# Patient Record
Sex: MALE | Race: BLACK OR AFRICAN AMERICAN | NOT HISPANIC OR LATINO | Employment: OTHER | URBAN - METROPOLITAN AREA
[De-identification: names, ages, dates, MRNs, and addresses within clinical notes are randomized per-mention and may not be internally consistent; named-entity substitution may affect disease eponyms.]

---

## 2023-05-07 ENCOUNTER — TELEPHONE (OUTPATIENT)
Dept: PHARMACY | Facility: HOSPITAL | Age: 78
End: 2023-05-07
Payer: MEDICARE

## 2023-05-07 NOTE — TELEPHONE ENCOUNTER
"Population Health Outreach Call  Payor: [employer]  Purpose of call: Assess for and address any barriers to medication adherence  Medication: lisinopril 20 mg tablet  Outcome: Unable to leave voice message at patient's listed phone number, phone number states \"calling restrictions\" in place.    "

## 2023-10-02 ENCOUNTER — OFFICE VISIT (OUTPATIENT)
Dept: PRIMARY CARE | Facility: CLINIC | Age: 78
End: 2023-10-02
Payer: MEDICARE

## 2023-10-02 ENCOUNTER — LAB (OUTPATIENT)
Dept: LAB | Facility: LAB | Age: 78
End: 2023-10-02
Payer: MEDICARE

## 2023-10-02 VITALS
DIASTOLIC BLOOD PRESSURE: 88 MMHG | HEART RATE: 75 BPM | HEIGHT: 71 IN | SYSTOLIC BLOOD PRESSURE: 162 MMHG | TEMPERATURE: 97.9 F | WEIGHT: 190 LBS | OXYGEN SATURATION: 96 % | BODY MASS INDEX: 26.6 KG/M2

## 2023-10-02 DIAGNOSIS — Z12.2 ENCOUNTER FOR SCREENING FOR LUNG CANCER: ICD-10-CM

## 2023-10-02 DIAGNOSIS — R53.82 CHRONIC FATIGUE: ICD-10-CM

## 2023-10-02 DIAGNOSIS — Z00.00 HEALTH CARE MAINTENANCE: Primary | ICD-10-CM

## 2023-10-02 DIAGNOSIS — Z12.5 PROSTATE CANCER SCREENING: ICD-10-CM

## 2023-10-02 DIAGNOSIS — Z00.00 HEALTH CARE MAINTENANCE: ICD-10-CM

## 2023-10-02 DIAGNOSIS — Z13.220 SCREENING CHOLESTEROL LEVEL: ICD-10-CM

## 2023-10-02 DIAGNOSIS — Z23 IMMUNIZATION DUE: ICD-10-CM

## 2023-10-02 DIAGNOSIS — L30.9 DERMATITIS: ICD-10-CM

## 2023-10-02 DIAGNOSIS — Z12.11 COLON CANCER SCREENING: ICD-10-CM

## 2023-10-02 DIAGNOSIS — Z13.1 DIABETES MELLITUS SCREENING: ICD-10-CM

## 2023-10-02 DIAGNOSIS — R73.9 HYPERGLYCEMIA: ICD-10-CM

## 2023-10-02 DIAGNOSIS — I71.43 INFRARENAL ABDOMINAL AORTIC ANEURYSM (AAA) WITHOUT RUPTURE (CMS-HCC): ICD-10-CM

## 2023-10-02 DIAGNOSIS — I71.20 THORACIC AORTIC ANEURYSM WITHOUT RUPTURE, UNSPECIFIED PART (CMS-HCC): ICD-10-CM

## 2023-10-02 PROBLEM — I71.40 AAA (ABDOMINAL AORTIC ANEURYSM) (CMS-HCC): Status: ACTIVE | Noted: 2023-10-02

## 2023-10-02 PROBLEM — R73.09 ELEVATED HEMOGLOBIN A1C: Status: ACTIVE | Noted: 2023-10-02

## 2023-10-02 PROBLEM — N52.9 ED (ERECTILE DYSFUNCTION): Status: ACTIVE | Noted: 2023-10-02

## 2023-10-02 PROBLEM — Z98.62 STATUS POST ANGIOPLASTY: Status: ACTIVE | Noted: 2023-10-02

## 2023-10-02 PROBLEM — R97.20 ELEVATED PSA: Status: ACTIVE | Noted: 2023-10-02

## 2023-10-02 PROBLEM — D12.6 TUBULOVILLOUS ADENOMA OF COLON: Status: ACTIVE | Noted: 2023-10-02

## 2023-10-02 PROBLEM — J38.02 BILATERAL PARTIAL VOCAL CORD PARALYSIS: Status: ACTIVE | Noted: 2023-10-02

## 2023-10-02 PROBLEM — F17.210 CIGARETTE NICOTINE DEPENDENCE: Status: ACTIVE | Noted: 2023-10-02

## 2023-10-02 PROBLEM — R13.12 DYSPHAGIA, OROPHARYNGEAL PHASE: Status: ACTIVE | Noted: 2023-10-02

## 2023-10-02 PROBLEM — D64.9 CHRONIC ANEMIA: Status: ACTIVE | Noted: 2023-10-02

## 2023-10-02 PROBLEM — I10 HYPERTENSION: Status: ACTIVE | Noted: 2023-10-02

## 2023-10-02 PROBLEM — I77.810 ASCENDING AORTA DILATION (CMS-HCC): Status: ACTIVE | Noted: 2023-10-02

## 2023-10-02 PROBLEM — E78.5 HYPERLIPIDEMIA: Status: ACTIVE | Noted: 2023-10-02

## 2023-10-02 PROBLEM — I25.10 CAD (CORONARY ARTERY DISEASE): Status: ACTIVE | Noted: 2023-10-02

## 2023-10-02 PROBLEM — J38.7 EPIGLOTTIC LESION: Status: ACTIVE | Noted: 2023-10-02

## 2023-10-02 PROBLEM — Z95.5 CORONARY STENT PATENT: Status: ACTIVE | Noted: 2023-10-02

## 2023-10-02 PROBLEM — I25.2 PAST MYOCARDIAL INFARCTION: Status: ACTIVE | Noted: 2023-10-02

## 2023-10-02 PROBLEM — I72.3 ILIAC ARTERY ANEURYSM (CMS-HCC): Status: ACTIVE | Noted: 2023-10-02

## 2023-10-02 LAB
ERYTHROCYTE [DISTWIDTH] IN BLOOD BY AUTOMATED COUNT: 15.9 % (ref 11.5–14.5)
EST. AVERAGE GLUCOSE BLD GHB EST-MCNC: 117 MG/DL
HBA1C MFR BLD: 5.7 %
HCT VFR BLD AUTO: 41.4 % (ref 41–52)
HGB BLD-MCNC: 12.8 G/DL (ref 13.5–17.5)
MCH RBC QN AUTO: 28.4 PG (ref 26–34)
MCHC RBC AUTO-ENTMCNC: 30.9 G/DL (ref 32–36)
MCV RBC AUTO: 92 FL (ref 80–100)
NRBC BLD-RTO: 0 /100 WBCS (ref 0–0)
PLATELET # BLD AUTO: 223 X10*3/UL (ref 150–450)
PMV BLD AUTO: 11 FL (ref 7.5–11.5)
RBC # BLD AUTO: 4.5 X10*6/UL (ref 4.5–5.9)
WBC # BLD AUTO: 4.5 X10*3/UL (ref 4.4–11.3)

## 2023-10-02 PROCEDURE — 3077F SYST BP >= 140 MM HG: CPT | Performed by: FAMILY MEDICINE

## 2023-10-02 PROCEDURE — 3079F DIAST BP 80-89 MM HG: CPT | Performed by: FAMILY MEDICINE

## 2023-10-02 PROCEDURE — 36415 COLL VENOUS BLD VENIPUNCTURE: CPT

## 2023-10-02 PROCEDURE — G0439 PPPS, SUBSEQ VISIT: HCPCS | Performed by: FAMILY MEDICINE

## 2023-10-02 PROCEDURE — 85027 COMPLETE CBC AUTOMATED: CPT

## 2023-10-02 PROCEDURE — G0103 PSA SCREENING: HCPCS

## 2023-10-02 PROCEDURE — 1126F AMNT PAIN NOTED NONE PRSNT: CPT | Performed by: FAMILY MEDICINE

## 2023-10-02 RX ORDER — AZITHROMYCIN 500 MG/1
TABLET, FILM COATED ORAL
COMMUNITY
Start: 2021-11-19 | End: 2023-10-02 | Stop reason: ALTCHOICE

## 2023-10-02 RX ORDER — FAMOTIDINE 40 MG/1
TABLET, FILM COATED ORAL
COMMUNITY
Start: 2021-11-05 | End: 2023-10-02 | Stop reason: SDUPTHER

## 2023-10-02 RX ORDER — ALBUTEROL SULFATE 90 UG/1
AEROSOL, METERED RESPIRATORY (INHALATION)
COMMUNITY
Start: 2021-11-18

## 2023-10-02 RX ORDER — LISINOPRIL 20 MG/1
20 TABLET ORAL DAILY
COMMUNITY
End: 2023-10-02 | Stop reason: SDUPTHER

## 2023-10-02 RX ORDER — DEXAMETHASONE 6 MG/1
TABLET ORAL EVERY 24 HOURS
COMMUNITY
Start: 2021-11-19 | End: 2023-10-02 | Stop reason: ALTCHOICE

## 2023-10-02 RX ORDER — BENZONATATE 100 MG/1
CAPSULE ORAL
COMMUNITY
Start: 2021-11-18 | End: 2023-10-02

## 2023-10-02 RX ORDER — NAPROXEN SODIUM 220 MG/1
1 TABLET, FILM COATED ORAL DAILY
COMMUNITY
Start: 2015-07-11 | End: 2023-10-02 | Stop reason: SDUPTHER

## 2023-10-02 RX ORDER — TRIAMCINOLONE ACETONIDE 1 MG/G
CREAM TOPICAL
COMMUNITY
Start: 2016-02-22 | End: 2023-10-02 | Stop reason: SDUPTHER

## 2023-10-02 RX ORDER — ACETAMINOPHEN 325 MG/1
TABLET ORAL EVERY 6 HOURS PRN
COMMUNITY
Start: 2021-09-03

## 2023-10-02 RX ORDER — METOPROLOL SUCCINATE 25 MG/1
1 TABLET, EXTENDED RELEASE ORAL DAILY
COMMUNITY
Start: 2015-07-11 | End: 2023-10-02 | Stop reason: SDUPTHER

## 2023-10-02 RX ORDER — LISINOPRIL 20 MG/1
20 TABLET ORAL DAILY
Qty: 90 TABLET | Refills: 1 | Status: SHIPPED | OUTPATIENT
Start: 2023-10-02

## 2023-10-02 RX ORDER — TRIAMCINOLONE ACETONIDE 1 MG/G
CREAM TOPICAL
Qty: 28 G | Refills: 2 | Status: SHIPPED | OUTPATIENT
Start: 2023-10-02 | End: 2023-10-08

## 2023-10-02 RX ORDER — FAMOTIDINE 40 MG/1
40 TABLET, FILM COATED ORAL DAILY
Qty: 90 TABLET | Refills: 1 | Status: SHIPPED | OUTPATIENT
Start: 2023-10-02

## 2023-10-02 RX ORDER — METOPROLOL SUCCINATE 25 MG/1
25 TABLET, EXTENDED RELEASE ORAL DAILY
Qty: 90 TABLET | Refills: 1 | Status: SHIPPED | OUTPATIENT
Start: 2023-10-02

## 2023-10-02 RX ORDER — ROSUVASTATIN CALCIUM 40 MG/1
40 TABLET, COATED ORAL DAILY
COMMUNITY
End: 2023-10-02 | Stop reason: SDUPTHER

## 2023-10-02 RX ORDER — GUAIFENESIN 1200 MG/1
TABLET, EXTENDED RELEASE ORAL EVERY 12 HOURS
COMMUNITY
Start: 2021-11-18 | End: 2023-10-02 | Stop reason: ALTCHOICE

## 2023-10-02 RX ORDER — TAMSULOSIN HYDROCHLORIDE 0.4 MG/1
1 CAPSULE ORAL NIGHTLY
COMMUNITY
Start: 2021-09-03 | End: 2023-10-02 | Stop reason: SDUPTHER

## 2023-10-02 RX ORDER — ROSUVASTATIN CALCIUM 40 MG/1
40 TABLET, COATED ORAL DAILY
Qty: 90 TABLET | Refills: 1 | Status: SHIPPED | OUTPATIENT
Start: 2023-10-02

## 2023-10-02 RX ORDER — TAMSULOSIN HYDROCHLORIDE 0.4 MG/1
0.4 CAPSULE ORAL NIGHTLY
Qty: 90 CAPSULE | Refills: 1 | Status: SHIPPED | OUTPATIENT
Start: 2023-10-02

## 2023-10-02 RX ORDER — ATORVASTATIN CALCIUM 80 MG/1
TABLET, FILM COATED ORAL
COMMUNITY
Start: 2015-07-11 | End: 2023-10-02 | Stop reason: ALTCHOICE

## 2023-10-02 RX ORDER — ASPIRIN 81 MG/1
TABLET ORAL
COMMUNITY

## 2023-10-02 ASSESSMENT — PATIENT HEALTH QUESTIONNAIRE - PHQ9
2. FEELING DOWN, DEPRESSED OR HOPELESS: NOT AT ALL
SUM OF ALL RESPONSES TO PHQ9 QUESTIONS 1 AND 2: 0
1. LITTLE INTEREST OR PLEASURE IN DOING THINGS: NOT AT ALL

## 2023-10-02 ASSESSMENT — PAIN SCALES - GENERAL: PAINLEVEL: 0-NO PAIN

## 2023-10-02 NOTE — PROGRESS NOTES
Subjective   Patient ID: Raúl Escalera is a 77 y.o. male who presents for Annual Exam.  HPI    A review of system was completed.  All systems were reviewed and were normal, except for the ones that are listed in the HPI.    Objective   Physical Exam  Constitutional:       Appearance: Normal appearance.   HENT:      Head: Normocephalic and atraumatic.      Right Ear: Tympanic membrane, ear canal and external ear normal.      Left Ear: Tympanic membrane, ear canal and external ear normal.      Nose: Nose normal.      Mouth/Throat:      Mouth: Mucous membranes are moist.      Pharynx: Oropharynx is clear.   Eyes:      Extraocular Movements: Extraocular movements intact.      Conjunctiva/sclera: Conjunctivae normal.      Pupils: Pupils are equal, round, and reactive to light.   Cardiovascular:      Rate and Rhythm: Normal rate and regular rhythm.      Pulses: Normal pulses.   Pulmonary:      Effort: Pulmonary effort is normal.      Breath sounds: Normal breath sounds.   Abdominal:      General: Abdomen is flat. Bowel sounds are normal.      Palpations: Abdomen is soft.   Musculoskeletal:         General: Normal range of motion.      Cervical back: Normal range of motion and neck supple.   Skin:     General: Skin is warm.   Neurological:      General: No focal deficit present.      Mental Status: He is alert and oriented to person, place, and time. Mental status is at baseline.   Psychiatric:         Mood and Affect: Mood normal.         Behavior: Behavior normal.         Thought Content: Thought content normal.         Judgment: Judgment normal.     Assessment/Plan   Problem List Items Addressed This Visit       AAA (abdominal aortic aneurysm) (CMS/[facility])    Relevant Medications    rosuvastatin (Crestor) 40 mg tablet    metoprolol succinate XL (Toprol-XL) 25 mg 24 hr tablet    lisinopril 20 mg tablet    famotidine (Pepcid) 40 mg tablet    Other Relevant Orders    CT angio chest abdomen pelvis    Thoracic aortic aneurysm  without rupture (CMS/HCC)    Relevant Orders    CBC    Comprehensive Metabolic Panel    Hemoglobin A1C    TSH with reflex to Free T4 if abnormal    LDL cholesterol, direct    PSA, Total and Free    Health care maintenance - Primary     -PSA ordered.   -colonoscopy 2020- repeat in 5y.  -Immunizations declined.          Relevant Medications    tamsulosin (Flomax) 0.4 mg 24 hr capsule    rosuvastatin (Crestor) 40 mg tablet    metoprolol succinate XL (Toprol-XL) 25 mg 24 hr tablet    lisinopril 20 mg tablet    famotidine (Pepcid) 40 mg tablet    triamcinolone (Kenalog) 0.1 % cream    Other Relevant Orders    CT angio chest abdomen pelvis    CBC    Comprehensive Metabolic Panel    Hemoglobin A1C    TSH with reflex to Free T4 if abnormal    LDL cholesterol, direct    PSA, Total and Free    Encounter for screening for lung cancer    Diabetes mellitus screening    Prostate cancer screening    Relevant Orders    PSA, Total and Free    Immunization due    Colon cancer screening    Screening cholesterol level     Other Visit Diagnoses       Dermatitis        Relevant Medications    triamcinolone (Kenalog) 0.1 % cream    Hyperglycemia        Relevant Orders    Hemoglobin A1C    Chronic fatigue        Relevant Orders    TSH with reflex to Free T4 if abnormal           Patient to return to office in 6 months.

## 2023-10-03 LAB
ALBUMIN SERPL BCP-MCNC: 3.6 G/DL (ref 3.4–5)
ALP SERPL-CCNC: 51 U/L (ref 33–136)
ALT SERPL W P-5'-P-CCNC: 7 U/L (ref 10–52)
ANION GAP SERPL CALC-SCNC: 11 MMOL/L (ref 10–20)
AST SERPL W P-5'-P-CCNC: 9 U/L (ref 9–39)
BILIRUB SERPL-MCNC: 0.3 MG/DL (ref 0–1.2)
BUN SERPL-MCNC: 17 MG/DL (ref 6–23)
CALCIUM SERPL-MCNC: 9 MG/DL (ref 8.6–10.6)
CHLORIDE SERPL-SCNC: 105 MMOL/L (ref 98–107)
CO2 SERPL-SCNC: 29 MMOL/L (ref 21–32)
CREAT SERPL-MCNC: 1.05 MG/DL (ref 0.5–1.3)
GFR SERPL CREATININE-BSD FRML MDRD: 74 ML/MIN/1.73M*2
GLUCOSE SERPL-MCNC: 123 MG/DL (ref 74–99)
LDLC SERPL DIRECT ASSAY-MCNC: 123 MG/DL (ref 0–129)
POTASSIUM SERPL-SCNC: 4.3 MMOL/L (ref 3.5–5.3)
PROT SERPL-MCNC: 6.4 G/DL (ref 6.4–8.2)
SODIUM SERPL-SCNC: 141 MMOL/L (ref 136–145)
TSH SERPL-ACNC: 1.14 MIU/L (ref 0.44–3.98)

## 2023-10-05 LAB
PSA FREE MFR SERPL: 5 %
PSA FREE SERPL-MCNC: 0.7 NG/ML
PSA SERPL IA-MCNC: 12.8 NG/ML (ref 0–4)

## 2023-10-08 RX ORDER — TRIAMCINOLONE ACETONIDE 1 MG/G
CREAM TOPICAL
Qty: 30 G | Refills: 2 | Status: SHIPPED | OUTPATIENT
Start: 2023-10-08 | End: 2023-11-09 | Stop reason: SDUPTHER

## 2023-10-09 DIAGNOSIS — L30.9 DERMATITIS: ICD-10-CM

## 2023-10-09 DIAGNOSIS — Z00.00 HEALTH CARE MAINTENANCE: ICD-10-CM

## 2023-10-11 DIAGNOSIS — R97.20 ELEVATED PSA: Primary | ICD-10-CM

## 2023-11-09 RX ORDER — TRIAMCINOLONE ACETONIDE 1 MG/G
CREAM TOPICAL
Qty: 30 G | Refills: 2 | OUTPATIENT
Start: 2023-11-09

## 2023-11-09 RX ORDER — TRIAMCINOLONE ACETONIDE 1 MG/G
CREAM TOPICAL
Qty: 30 G | Refills: 2 | Status: SHIPPED | OUTPATIENT
Start: 2023-11-09

## 2024-04-02 ENCOUNTER — APPOINTMENT (OUTPATIENT)
Dept: PRIMARY CARE | Facility: CLINIC | Age: 79
End: 2024-04-02
Payer: MEDICARE

## 2024-09-16 ENCOUNTER — LAB (OUTPATIENT)
Dept: LAB | Facility: LAB | Age: 79
End: 2024-09-16
Payer: MEDICARE

## 2024-09-16 ENCOUNTER — APPOINTMENT (OUTPATIENT)
Dept: PRIMARY CARE | Facility: CLINIC | Age: 79
End: 2024-09-16
Payer: MEDICARE

## 2024-09-16 ENCOUNTER — HOSPITAL ENCOUNTER (OUTPATIENT)
Dept: RADIOLOGY | Facility: CLINIC | Age: 79
Discharge: HOME | End: 2024-09-16
Payer: MEDICARE

## 2024-09-16 VITALS
SYSTOLIC BLOOD PRESSURE: 166 MMHG | TEMPERATURE: 98 F | HEART RATE: 69 BPM | WEIGHT: 204 LBS | BODY MASS INDEX: 28.45 KG/M2 | DIASTOLIC BLOOD PRESSURE: 85 MMHG

## 2024-09-16 DIAGNOSIS — R10.9 ACUTE LEFT FLANK PAIN: ICD-10-CM

## 2024-09-16 DIAGNOSIS — I71.20 THORACIC AORTIC ANEURYSM WITHOUT RUPTURE, UNSPECIFIED PART (CMS-HCC): ICD-10-CM

## 2024-09-16 DIAGNOSIS — R97.20 ELEVATED PSA: ICD-10-CM

## 2024-09-16 DIAGNOSIS — N39.43 POST-VOID DRIBBLING: ICD-10-CM

## 2024-09-16 DIAGNOSIS — Z00.00 HEALTH CARE MAINTENANCE: ICD-10-CM

## 2024-09-16 DIAGNOSIS — Z00.00 HEALTH CARE MAINTENANCE: Primary | ICD-10-CM

## 2024-09-16 LAB
APPEARANCE UR: ABNORMAL
BILIRUB UR STRIP.AUTO-MCNC: NEGATIVE MG/DL
COLOR UR: ABNORMAL
GLUCOSE UR STRIP.AUTO-MCNC: NORMAL MG/DL
KETONES UR STRIP.AUTO-MCNC: NEGATIVE MG/DL
LEUKOCYTE ESTERASE UR QL STRIP.AUTO: ABNORMAL
MUCOUS THREADS #/AREA URNS AUTO: ABNORMAL /LPF
NITRITE UR QL STRIP.AUTO: NEGATIVE
PH UR STRIP.AUTO: 6.5 [PH]
PROT UR STRIP.AUTO-MCNC: ABNORMAL MG/DL
RBC # UR STRIP.AUTO: NEGATIVE /UL
RBC #/AREA URNS AUTO: ABNORMAL /HPF
SP GR UR STRIP.AUTO: 1.02
SQUAMOUS #/AREA URNS AUTO: ABNORMAL /HPF
UROBILINOGEN UR STRIP.AUTO-MCNC: NORMAL MG/DL
WBC #/AREA URNS AUTO: ABNORMAL /HPF

## 2024-09-16 PROCEDURE — 72110 X-RAY EXAM L-2 SPINE 4/>VWS: CPT | Performed by: RADIOLOGY

## 2024-09-16 PROCEDURE — 3079F DIAST BP 80-89 MM HG: CPT | Performed by: FAMILY MEDICINE

## 2024-09-16 PROCEDURE — 3077F SYST BP >= 140 MM HG: CPT | Performed by: FAMILY MEDICINE

## 2024-09-16 PROCEDURE — 72110 X-RAY EXAM L-2 SPINE 4/>VWS: CPT

## 2024-09-16 PROCEDURE — 1159F MED LIST DOCD IN RCRD: CPT | Performed by: FAMILY MEDICINE

## 2024-09-16 PROCEDURE — 99214 OFFICE O/P EST MOD 30 MIN: CPT | Performed by: FAMILY MEDICINE

## 2024-09-16 PROCEDURE — 87086 URINE CULTURE/COLONY COUNT: CPT

## 2024-09-16 PROCEDURE — 81001 URINALYSIS AUTO W/SCOPE: CPT

## 2024-09-16 RX ORDER — FAMOTIDINE 40 MG/1
40 TABLET, FILM COATED ORAL DAILY
Qty: 90 TABLET | Refills: 1 | Status: SHIPPED | OUTPATIENT
Start: 2024-09-16

## 2024-09-16 RX ORDER — LISINOPRIL 20 MG/1
20 TABLET ORAL DAILY
Qty: 90 TABLET | Refills: 1 | Status: SHIPPED | OUTPATIENT
Start: 2024-09-16

## 2024-09-16 RX ORDER — METOPROLOL SUCCINATE 25 MG/1
25 TABLET, EXTENDED RELEASE ORAL DAILY
Qty: 90 TABLET | Refills: 1 | Status: SHIPPED | OUTPATIENT
Start: 2024-09-16

## 2024-09-16 RX ORDER — TAMSULOSIN HYDROCHLORIDE 0.4 MG/1
0.4 CAPSULE ORAL NIGHTLY
Qty: 90 CAPSULE | Refills: 1 | Status: SHIPPED | OUTPATIENT
Start: 2024-09-16

## 2024-09-16 RX ORDER — ROSUVASTATIN CALCIUM 40 MG/1
40 TABLET, COATED ORAL DAILY
Qty: 90 TABLET | Refills: 1 | Status: SHIPPED | OUTPATIENT
Start: 2024-09-16

## 2024-09-16 NOTE — ASSESSMENT & PLAN NOTE
-UA w/ C+S ordered.  -L-spine XR ordered today.  Patient has a known history of elevated PSA.   -Pain management referral done per patient's today.

## 2024-09-16 NOTE — PROGRESS NOTES
Subjective   Patient ID: Raúl Escalera is a 78 y.o. male who presents for Follow-up.  HPI    77 yo AAM w/ h/o CAD s/p RCA stent 7/15, s/p NSTEMI 7/15 (CP), Ao aneurysm, AAA s/p EVAR 9/21, HTN, HPL, COVID PNA 11/21 now here for:    1- The management of a left flank pain.  It started a month ago.  Worse with prolong standing and occasionally radiates into the left thigh.  There has been no associated trauma. Patient  has a known history of elevated PSA.  Not yet addressed.   2- Urinary incontinence.  Patient has some occasional post void leakage.  It started when he ran out of Tamsulosin.   3- Medication refills.     A review of system was completed.  All systems were reviewed and were normal, except for the ones that are listed in the HPI.    Objective   Physical Exam  Constitutional:       Appearance: Normal appearance.   HENT:      Head: Normocephalic and atraumatic.      Right Ear: Tympanic membrane, ear canal and external ear normal.      Left Ear: Tympanic membrane, ear canal and external ear normal.      Nose: Nose normal.      Mouth/Throat:      Mouth: Mucous membranes are moist.      Pharynx: Oropharynx is clear.   Eyes:      Extraocular Movements: Extraocular movements intact.      Conjunctiva/sclera: Conjunctivae normal.      Pupils: Pupils are equal, round, and reactive to light.   Cardiovascular:      Rate and Rhythm: Normal rate and regular rhythm.      Pulses: Normal pulses.   Pulmonary:      Effort: Pulmonary effort is normal.      Breath sounds: Normal breath sounds.   Abdominal:      General: Abdomen is flat. Bowel sounds are normal.      Palpations: Abdomen is soft.   Musculoskeletal:         General: Normal range of motion.      Cervical back: Normal range of motion and neck supple.   Skin:     General: Skin is warm.   Neurological:      General: No focal deficit present.      Mental Status: He is alert and oriented to person, place, and time. Mental status is at baseline.   Psychiatric:          Mood and Affect: Mood normal.         Behavior: Behavior normal.         Thought Content: Thought content normal.         Judgment: Judgment normal.     Assessment/Plan   Problem List Items Addressed This Visit       Elevated PSA    Relevant Orders    Referral to Urology    Thoracic aortic aneurysm without rupture (CMS-HCC)       -Under the care of a cardiologist.         Health care maintenance - Primary    Relevant Medications    famotidine (Pepcid) 40 mg tablet    lisinopril 20 mg tablet    metoprolol succinate XL (Toprol-XL) 25 mg 24 hr tablet    rosuvastatin (Crestor) 40 mg tablet    tamsulosin (Flomax) 0.4 mg 24 hr capsule    Post-void dribbling    Relevant Orders    POCT UA Automated manually resulted    Urine Culture    Referral to Urology    Acute left flank pain     -UA w/ C+S ordered.  -L-spine XR ordered today.  Patient has a known history of elevated PSA.   -Pain management referral done per patient's today.          Relevant Orders    Referral to Pain Medicine    XR lumbar spine 2-3 views    Referral to Urology    Patient to return to office in 11/ 2024 for your MWV.

## 2024-09-18 LAB — BACTERIA UR CULT: ABNORMAL

## 2024-09-25 DIAGNOSIS — R10.9 ACUTE LEFT FLANK PAIN: Primary | ICD-10-CM

## 2024-09-26 ENCOUNTER — TELEPHONE (OUTPATIENT)
Dept: PRIMARY CARE | Facility: CLINIC | Age: 79
End: 2024-09-26
Payer: MEDICARE

## 2024-10-17 ENCOUNTER — APPOINTMENT (OUTPATIENT)
Dept: UROLOGY | Facility: CLINIC | Age: 79
End: 2024-10-17
Payer: MEDICARE

## 2024-11-11 PROBLEM — Z86.0100 HISTORY OF COLONIC POLYPS: Status: ACTIVE | Noted: 2024-11-11

## 2024-11-11 PROBLEM — S37.009A INJURY OF KIDNEY: Status: ACTIVE | Noted: 2024-11-11

## 2024-11-11 PROBLEM — F17.210 CIGARETTE SMOKER: Status: ACTIVE | Noted: 2024-11-11

## 2024-11-11 PROBLEM — R22.1 NECK MASS: Status: ACTIVE | Noted: 2024-11-11

## 2024-11-11 PROBLEM — R35.1 NOCTURIA: Status: ACTIVE | Noted: 2024-11-11

## 2024-11-11 PROBLEM — K92.2 GASTROINTESTINAL HEMORRHAGE: Status: ACTIVE | Noted: 2024-11-11

## 2024-11-11 PROBLEM — R33.9 URINARY RETENTION: Status: ACTIVE | Noted: 2024-11-11

## 2024-11-11 PROBLEM — R49.0 DYSPHONIA: Status: ACTIVE | Noted: 2024-11-11

## 2024-11-11 PROBLEM — J06.9 ACUTE URI: Status: ACTIVE | Noted: 2024-11-11

## 2024-11-27 ENCOUNTER — APPOINTMENT (OUTPATIENT)
Dept: PRIMARY CARE | Facility: CLINIC | Age: 79
End: 2024-11-27
Payer: MEDICARE

## 2025-06-15 DIAGNOSIS — Z00.00 HEALTH CARE MAINTENANCE: ICD-10-CM

## 2025-06-22 RX ORDER — FAMOTIDINE 40 MG/1
40 TABLET, FILM COATED ORAL DAILY
Qty: 90 TABLET | Refills: 1 | Status: SHIPPED | OUTPATIENT
Start: 2025-06-22

## 2025-07-22 ENCOUNTER — HOSPITAL ENCOUNTER (OUTPATIENT)
Dept: RADIOLOGY | Facility: CLINIC | Age: 80
Discharge: HOME | End: 2025-07-22
Payer: MEDICARE

## 2025-07-22 ENCOUNTER — APPOINTMENT (OUTPATIENT)
Dept: PRIMARY CARE | Facility: CLINIC | Age: 80
End: 2025-07-22
Payer: MEDICARE

## 2025-07-22 VITALS
TEMPERATURE: 98.2 F | HEART RATE: 66 BPM | WEIGHT: 198 LBS | SYSTOLIC BLOOD PRESSURE: 132 MMHG | BODY MASS INDEX: 27.62 KG/M2 | DIASTOLIC BLOOD PRESSURE: 56 MMHG

## 2025-07-22 DIAGNOSIS — R97.20 ELEVATED PSA: ICD-10-CM

## 2025-07-22 DIAGNOSIS — I25.10 CORONARY ARTERY DISEASE INVOLVING NATIVE CORONARY ARTERY OF NATIVE HEART WITHOUT ANGINA PECTORIS: ICD-10-CM

## 2025-07-22 DIAGNOSIS — Z01.818 PREOPERATIVE CLEARANCE: ICD-10-CM

## 2025-07-22 DIAGNOSIS — Z01.818 PREOPERATIVE CLEARANCE: Primary | ICD-10-CM

## 2025-07-22 DIAGNOSIS — I71.43 ANEURYSM OF INFRARENAL ABDOMINAL AORTA, UNSPECIFIED WHETHER RUPTURED: ICD-10-CM

## 2025-07-22 DIAGNOSIS — F17.219 CIGARETTE NICOTINE DEPENDENCE WITH NICOTINE-INDUCED DISORDER: ICD-10-CM

## 2025-07-22 DIAGNOSIS — R73.09 ELEVATED HEMOGLOBIN A1C: ICD-10-CM

## 2025-07-22 DIAGNOSIS — I10 PRIMARY HYPERTENSION: ICD-10-CM

## 2025-07-22 PROCEDURE — 71046 X-RAY EXAM CHEST 2 VIEWS: CPT

## 2025-07-22 PROCEDURE — G2211 COMPLEX E/M VISIT ADD ON: HCPCS | Performed by: FAMILY MEDICINE

## 2025-07-22 PROCEDURE — 3078F DIAST BP <80 MM HG: CPT | Performed by: FAMILY MEDICINE

## 2025-07-22 PROCEDURE — 99214 OFFICE O/P EST MOD 30 MIN: CPT | Performed by: FAMILY MEDICINE

## 2025-07-22 PROCEDURE — 71046 X-RAY EXAM CHEST 2 VIEWS: CPT | Performed by: RADIOLOGY

## 2025-07-22 PROCEDURE — 1159F MED LIST DOCD IN RCRD: CPT | Performed by: FAMILY MEDICINE

## 2025-07-22 PROCEDURE — 3075F SYST BP GE 130 - 139MM HG: CPT | Performed by: FAMILY MEDICINE

## 2025-07-22 NOTE — ASSESSMENT & PLAN NOTE
-He has a hx of CAD as above.  Cardiac clearance required including anticoagulation management.   -HTN: well controlled.    -COPD hx.  No inhalers use.  CXR ordered.  -blood test ordered.

## 2025-07-22 NOTE — PROGRESS NOTES
Subjective   Patient ID: Raúl Escalera is a 79 y.o. male who presents for pre dental procedure clearance.    HPI    79 yo AAM w/ h/o CAD s/p RCA stent 7/15, s/p NSTEMI 7/15 (CP), Ao aneurysm, AAA s/p EVAR 9/21, HTN, HPL, COVID PNA 11/21, now here for:    1-pre dental procedure clearance.  -He has a hx of CAD as above.   -HTN: well controlled.   -COPD hx.  No inhalers use.     A review of system was completed.  All systems were reviewed and were normal, except for the ones that are listed in the HPI.    Objective   Physical Exam  Constitutional:       Appearance: Normal appearance.   HENT:      Head: Normocephalic and atraumatic.      Right Ear: Tympanic membrane, ear canal and external ear normal.      Left Ear: Tympanic membrane, ear canal and external ear normal.      Nose: Nose normal.      Mouth/Throat:      Mouth: Mucous membranes are moist.      Pharynx: Oropharynx is clear.     Eyes:      Extraocular Movements: Extraocular movements intact.      Conjunctiva/sclera: Conjunctivae normal.      Pupils: Pupils are equal, round, and reactive to light.       Cardiovascular:      Rate and Rhythm: Normal rate and regular rhythm.      Pulses: Normal pulses.   Pulmonary:      Effort: Pulmonary effort is normal.      Breath sounds: Normal breath sounds.   Abdominal:      General: Abdomen is flat. Bowel sounds are normal.      Palpations: Abdomen is soft.     Musculoskeletal:         General: Normal range of motion.      Cervical back: Normal range of motion and neck supple.     Skin:     General: Skin is warm.     Neurological:      General: No focal deficit present.      Mental Status: He is alert and oriented to person, place, and time. Mental status is at baseline.     Psychiatric:         Mood and Affect: Mood normal.         Behavior: Behavior normal.         Thought Content: Thought content normal.         Judgment: Judgment normal.         Assessment/Plan   Problem List Items Addressed This Visit       AAA  (abdominal aortic aneurysm)    Relevant Orders    XR chest 2 views    CBC    Comprehensive Metabolic Panel    Hemoglobin A1C    Lipid Panel    TSH with reflex to Free T4 if abnormal    [facility] (PT, PTT)    CAD (coronary artery disease)    Relevant Orders    XR chest 2 views    CBC    Comprehensive Metabolic Panel    Hemoglobin A1C    Lipid Panel    TSH with reflex to Free T4 if abnormal    [facility] (PT, PTT)    Cigarette nicotine dependence    Elevated hemoglobin A1c    Relevant Orders    XR chest 2 views    CBC    Comprehensive Metabolic Panel    Hemoglobin A1C    Lipid Panel    TSH with reflex to Free T4 if abnormal    [facility] (PT, PTT)    Elevated PSA    Relevant Orders    PSA, Total and Free    Hypertension    Relevant Orders    TSH with reflex to Free T4 if abnormal    Preoperative clearance - Primary    -He has a hx of CAD as above.  Cardiac clearance required including anticoagulation management.   -HTN: well controlled.    -COPD hx.  No inhalers use.  CXR ordered.  -blood test ordered.          Relevant Orders    XR chest 2 views    CBC    Comprehensive Metabolic Panel    Hemoglobin A1C    Lipid Panel    TSH with reflex to Free T4 if abnormal    [facility] (PT, PTT)    Patient to return to office in 9/2025 for his MWV.

## 2025-07-25 LAB
ALBUMIN SERPL-MCNC: 3.8 G/DL (ref 3.6–5.1)
ALP SERPL-CCNC: 51 U/L (ref 35–144)
ALT SERPL-CCNC: 7 U/L (ref 9–46)
ANION GAP SERPL CALCULATED.4IONS-SCNC: 6 MMOL/L (CALC) (ref 7–17)
AST SERPL-CCNC: 9 U/L (ref 10–35)
BILIRUB SERPL-MCNC: 0.4 MG/DL (ref 0.2–1.2)
BUN SERPL-MCNC: 19 MG/DL (ref 7–25)
CALCIUM SERPL-MCNC: 8.8 MG/DL (ref 8.6–10.3)
CHLORIDE SERPL-SCNC: 103 MMOL/L (ref 98–110)
CHOLEST SERPL-MCNC: 148 MG/DL
CHOLEST/HDLC SERPL: 2.8 (CALC)
CO2 SERPL-SCNC: 31 MMOL/L (ref 20–32)
CREAT SERPL-MCNC: 1.07 MG/DL (ref 0.7–1.28)
EGFRCR SERPLBLD CKD-EPI 2021: 71 ML/MIN/1.73M2
ERYTHROCYTE [DISTWIDTH] IN BLOOD BY AUTOMATED COUNT: 14.8 % (ref 11–15)
EST. AVERAGE GLUCOSE BLD GHB EST-MCNC: 126 MG/DL
EST. AVERAGE GLUCOSE BLD GHB EST-SCNC: 7 MMOL/L
EXTRINS CLOTTING PATH PPP-IMP: ABNORMAL
GLUCOSE SERPL-MCNC: 96 MG/DL (ref 65–139)
HBA1C MFR BLD: 6 %
HCT VFR BLD AUTO: 40 % (ref 38.5–50)
HDLC SERPL-MCNC: 53 MG/DL
HGB BLD-MCNC: 12.2 G/DL (ref 13.2–17.1)
LDLC SERPL CALC-MCNC: 82 MG/DL (CALC)
MCH RBC QN AUTO: 28 PG (ref 27–33)
MCHC RBC AUTO-ENTMCNC: 30.5 G/DL (ref 32–36)
MCV RBC AUTO: 92 FL (ref 80–100)
NONHDLC SERPL-MCNC: 95 MG/DL (CALC)
PLATELET # BLD AUTO: 250 THOUSAND/UL (ref 140–400)
PMV BLD REES-ECKER: 10.3 FL (ref 7.5–12.5)
POTASSIUM SERPL-SCNC: 3.7 MMOL/L (ref 3.5–5.3)
PROT SERPL-MCNC: 7 G/DL (ref 6.1–8.1)
PROTHROMBIN TIME: 13 SEC (ref 9–11.5)
PSA FREE MFR SERPL: ABNORMAL % (CALC)
PSA FREE SERPL-MCNC: 0.9 NG/ML
PSA SERPL-MCNC: 18.7 NG/ML
RBC # BLD AUTO: 4.35 MILLION/UL (ref 4.2–5.8)
SCREEN APTT: 36 SEC
SODIUM SERPL-SCNC: 140 MMOL/L (ref 135–146)
TRIGL SERPL-MCNC: 51 MG/DL
TSH SERPL-ACNC: 1.01 MIU/L (ref 0.4–4.5)
WBC # BLD AUTO: 3.9 THOUSAND/UL (ref 3.8–10.8)

## 2025-07-28 ENCOUNTER — TELEPHONE (OUTPATIENT)
Dept: PRIMARY CARE | Facility: CLINIC | Age: 80
End: 2025-07-28
Payer: MEDICARE

## 2025-07-30 ENCOUNTER — TELEPHONE (OUTPATIENT)
Dept: PRIMARY CARE | Facility: CLINIC | Age: 80
End: 2025-07-30
Payer: MEDICARE

## 2025-09-08 ENCOUNTER — APPOINTMENT (OUTPATIENT)
Dept: PRIMARY CARE | Facility: CLINIC | Age: 80
End: 2025-09-08
Payer: MEDICARE